# Patient Record
Sex: MALE | Race: ASIAN | NOT HISPANIC OR LATINO | Employment: FULL TIME
[De-identification: names, ages, dates, MRNs, and addresses within clinical notes are randomized per-mention and may not be internally consistent; named-entity substitution may affect disease eponyms.]

---

## 2017-08-28 ENCOUNTER — RECORDS - HEALTHEAST (OUTPATIENT)
Dept: ADMINISTRATIVE | Facility: OTHER | Age: 32
End: 2017-08-28

## 2018-08-30 ENCOUNTER — OFFICE VISIT - HEALTHEAST (OUTPATIENT)
Dept: FAMILY MEDICINE | Facility: CLINIC | Age: 33
End: 2018-08-30

## 2018-08-30 DIAGNOSIS — B02.9 HERPES ZOSTER WITHOUT COMPLICATION: ICD-10-CM

## 2018-08-30 ASSESSMENT — MIFFLIN-ST. JEOR: SCORE: 1742.79

## 2019-11-22 ENCOUNTER — OFFICE VISIT - HEALTHEAST (OUTPATIENT)
Dept: FAMILY MEDICINE | Facility: CLINIC | Age: 34
End: 2019-11-22

## 2019-11-22 DIAGNOSIS — Z00.00 HEALTH CARE MAINTENANCE: ICD-10-CM

## 2019-11-22 DIAGNOSIS — E66.811 OBESITY (BMI 30.0-34.9): ICD-10-CM

## 2019-11-22 DIAGNOSIS — E66.3 OVERWEIGHT (BMI 25.0-29.9): ICD-10-CM

## 2019-11-22 DIAGNOSIS — Z00.00 ROUTINE GENERAL MEDICAL EXAMINATION AT A HEALTH CARE FACILITY: ICD-10-CM

## 2019-11-22 DIAGNOSIS — Z71.84 TRAVEL ADVICE ENCOUNTER: ICD-10-CM

## 2019-11-22 LAB
CHOLEST SERPL-MCNC: 308 MG/DL
FASTING STATUS PATIENT QL REPORTED: NO
HBA1C MFR BLD: 5 % (ref 3.5–6)
HDLC SERPL-MCNC: 67 MG/DL
HIV 1+2 AB+HIV1 P24 AG SERPL QL IA: NEGATIVE
LDLC SERPL CALC-MCNC: 200 MG/DL
TRIGL SERPL-MCNC: 203 MG/DL

## 2019-11-22 RX ORDER — BISMUTH SUBSALICYLATE 262 MG/1
2 TABLET, CHEWABLE ORAL 4 TIMES DAILY PRN
Qty: 100 TABLET | Refills: 1 | Status: SHIPPED | OUTPATIENT
Start: 2019-11-22

## 2019-11-22 RX ORDER — AZITHROMYCIN 500 MG/1
1000 TABLET, FILM COATED ORAL
Qty: 6 TABLET | Refills: 0 | Status: SHIPPED | OUTPATIENT
Start: 2019-11-22

## 2019-11-22 ASSESSMENT — MIFFLIN-ST. JEOR: SCORE: 1670.21

## 2019-11-26 ENCOUNTER — COMMUNICATION - HEALTHEAST (OUTPATIENT)
Dept: FAMILY MEDICINE | Facility: CLINIC | Age: 34
End: 2019-11-26

## 2019-11-27 ENCOUNTER — COMMUNICATION - HEALTHEAST (OUTPATIENT)
Dept: FAMILY MEDICINE | Facility: CLINIC | Age: 34
End: 2019-11-27

## 2019-11-27 DIAGNOSIS — E78.5 HYPERLIPIDEMIA LDL GOAL <130: ICD-10-CM

## 2020-01-21 ENCOUNTER — OFFICE VISIT - HEALTHEAST (OUTPATIENT)
Dept: FAMILY MEDICINE | Facility: CLINIC | Age: 35
End: 2020-01-21

## 2020-01-21 DIAGNOSIS — E78.5 HYPERLIPIDEMIA LDL GOAL <130: ICD-10-CM

## 2020-01-21 ASSESSMENT — MIFFLIN-ST. JEOR: SCORE: 1720.11

## 2020-01-22 ENCOUNTER — AMBULATORY - HEALTHEAST (OUTPATIENT)
Dept: LAB | Facility: CLINIC | Age: 35
End: 2020-01-22

## 2020-01-22 DIAGNOSIS — E78.5 HYPERLIPIDEMIA LDL GOAL <130: ICD-10-CM

## 2020-01-22 LAB
CHOLEST SERPL-MCNC: 316 MG/DL
FASTING STATUS PATIENT QL REPORTED: YES
HDLC SERPL-MCNC: 63 MG/DL
LDLC SERPL CALC-MCNC: 230 MG/DL
TRIGL SERPL-MCNC: 114 MG/DL

## 2020-01-23 ENCOUNTER — COMMUNICATION - HEALTHEAST (OUTPATIENT)
Dept: FAMILY MEDICINE | Facility: CLINIC | Age: 35
End: 2020-01-23

## 2020-03-26 ENCOUNTER — AMBULATORY - HEALTHEAST (OUTPATIENT)
Dept: FAMILY MEDICINE | Facility: CLINIC | Age: 35
End: 2020-03-26

## 2020-03-26 ENCOUNTER — AMBULATORY - HEALTHEAST (OUTPATIENT)
Dept: LAB | Facility: CLINIC | Age: 35
End: 2020-03-26

## 2020-03-26 DIAGNOSIS — E78.5 HYPERLIPIDEMIA LDL GOAL <130: ICD-10-CM

## 2020-03-26 LAB
ALBUMIN SERPL-MCNC: 4.4 G/DL (ref 3.5–5)
ALP SERPL-CCNC: 40 U/L (ref 45–120)
ALT SERPL W P-5'-P-CCNC: 46 U/L (ref 0–45)
AST SERPL W P-5'-P-CCNC: 28 U/L (ref 0–40)
BILIRUB DIRECT SERPL-MCNC: 0.1 MG/DL
BILIRUB SERPL-MCNC: 0.4 MG/DL (ref 0–1)
CHOLEST SERPL-MCNC: 286 MG/DL
FASTING STATUS PATIENT QL REPORTED: YES
HDLC SERPL-MCNC: 56 MG/DL
LDLC SERPL CALC-MCNC: 179 MG/DL
PROT SERPL-MCNC: 8.1 G/DL (ref 6–8)
TRIGL SERPL-MCNC: 254 MG/DL

## 2020-03-27 ENCOUNTER — COMMUNICATION - HEALTHEAST (OUTPATIENT)
Dept: FAMILY MEDICINE | Facility: CLINIC | Age: 35
End: 2020-03-27

## 2020-03-27 DIAGNOSIS — E78.5 HYPERLIPIDEMIA LDL GOAL <130: ICD-10-CM

## 2020-05-11 ENCOUNTER — AMBULATORY - HEALTHEAST (OUTPATIENT)
Dept: LAB | Facility: CLINIC | Age: 35
End: 2020-05-11

## 2020-05-11 DIAGNOSIS — E78.5 HYPERLIPIDEMIA LDL GOAL <130: ICD-10-CM

## 2020-05-11 LAB
ALBUMIN SERPL-MCNC: 4.3 G/DL (ref 3.5–5)
ALP SERPL-CCNC: 34 U/L (ref 45–120)
ALT SERPL W P-5'-P-CCNC: 31 U/L (ref 0–45)
AST SERPL W P-5'-P-CCNC: 28 U/L (ref 0–40)
BILIRUB DIRECT SERPL-MCNC: 0.2 MG/DL
BILIRUB SERPL-MCNC: 0.5 MG/DL (ref 0–1)
CHOLEST SERPL-MCNC: 293 MG/DL
FASTING STATUS PATIENT QL REPORTED: YES
HDLC SERPL-MCNC: 57 MG/DL
LDLC SERPL CALC-MCNC: 203 MG/DL
PROT SERPL-MCNC: 7.4 G/DL (ref 6–8)
TRIGL SERPL-MCNC: 166 MG/DL

## 2020-05-12 ENCOUNTER — COMMUNICATION - HEALTHEAST (OUTPATIENT)
Dept: FAMILY MEDICINE | Facility: CLINIC | Age: 35
End: 2020-05-12

## 2020-05-12 DIAGNOSIS — E78.5 HYPERLIPIDEMIA LDL GOAL <130: ICD-10-CM

## 2020-05-14 RX ORDER — ROSUVASTATIN CALCIUM 40 MG/1
40 TABLET, COATED ORAL AT BEDTIME
Qty: 90 TABLET | Refills: 3 | Status: SHIPPED | OUTPATIENT
Start: 2020-05-14

## 2020-06-25 ENCOUNTER — AMBULATORY - HEALTHEAST (OUTPATIENT)
Dept: LAB | Facility: CLINIC | Age: 35
End: 2020-06-25

## 2020-06-25 DIAGNOSIS — E78.5 HYPERLIPIDEMIA LDL GOAL <130: ICD-10-CM

## 2020-06-25 LAB
CHOLEST SERPL-MCNC: 171 MG/DL
FASTING STATUS PATIENT QL REPORTED: YES
HDLC SERPL-MCNC: 58 MG/DL
LDLC SERPL CALC-MCNC: 98 MG/DL
TRIGL SERPL-MCNC: 77 MG/DL

## 2020-06-29 ENCOUNTER — COMMUNICATION - HEALTHEAST (OUTPATIENT)
Dept: FAMILY MEDICINE | Facility: CLINIC | Age: 35
End: 2020-06-29

## 2021-06-01 VITALS — BODY MASS INDEX: 31.99 KG/M2 | HEIGHT: 65 IN | WEIGHT: 192 LBS

## 2021-06-03 NOTE — TELEPHONE ENCOUNTER
Call pt and it took me directly to the voice mail. Left pt a message to call back.  Okay to relay message.

## 2021-06-03 NOTE — TELEPHONE ENCOUNTER
Call and spoke with pt regarding pt test result. Pt has a question if you are going to send them the medication to their pharmacy because pt does not have any cholesterol med to take.        ----- Message from Savannah Vincent MD sent at 11/26/2019  2:30 PM CST -----  Please tell the patient the following:  His cholesterol is very high.  His LDL, which should be less than 130 is 200.  With cholesterol this high, we recommend both changing diet and starting a cholesterol medication.  His HIV test is negative.  This means he does not have HIV.  His diabetes test is normal.  He does not have diabetes.    He was planning to travel for the month of November and December.  If he does not answer and we cannot get a hold of him directly, please try again in early January.  I will also send him a letter.

## 2021-06-03 NOTE — PROGRESS NOTES
Health Maintenance Exam  [facility]  Date of Service: 11/22/2019    Thao Ross is a 34 y.o. male who presents for a routine physical exam.     Current concerns include the following:    Travel:   11/25/19 - 12/26/19  Visiting [facility] Sachin Aguirre, Sachin Vasquez, Michelle  LaTha, Lavelle,     Active Non-Hospital Problems    Diagnosis     Overweight (BMI 25.0-29.9)     Past Medical History:   Diagnosis Date     No pertinent past medical history       Past Surgical History:   Procedure Laterality Date     NO PAST SURGERIES        Current Outpatient Medications   Medication Sig Dispense Refill     azithromycin (ZITHROMAX) 500 MG tablet Take 2 tablets (1,000 mg total) by mouth once as needed (travelers diarrhea). 6 tablet 0     bismuth subsalicylate (PEPTO-BISMOL) 262 mg Chew chew tab Chew 2 tablets (524 mg total) 4 (four) times a day as needed. For travelers diarrhea 100 tablet 1     mefloquine (LARIAM) 250 mg tablet Take 1 tablet (250 mg total) by mouth every 7 days for 10 doses. Begin 2 wks before departure, while in area, and for 4 wks after leaving. 10 tablet 0     No current facility-administered medications for this visit.       No Known Allergies   Social History     Socioeconomic History     Marital status:      Spouse name: Not on file     Number of children: 2     Years of education: Not on file     Highest education level: Associate degree: academic program   Occupational History     Occupation:    Social Needs     Financial resource strain: Not on file     Food insecurity:     Worry: Not on file     Inability: Not on file     Transportation needs:     Medical: Not on file     Non-medical: Not on file   Tobacco Use     Smoking status: Never Smoker     Smokeless tobacco: Never Used   Substance and Sexual Activity     Alcohol use: Yes     Comment: Only drinks on special occasions (just a little)     Drug use: Never     Sexual activity:  "Yes     Partners: Female     Birth control/protection: None     Comment: Planning pregnancy   Lifestyle     Physical activity:     Days per week: Not on file     Minutes per session: Not on file     Stress: Not on file   Relationships     Social connections:     Talks on phone: Not on file     Gets together: Not on file     Attends Voodoo service: Not on file     Active member of club or organization: Not on file     Attends meetings of clubs or organizations: Not on file     Relationship status: Not on file     Intimate partner violence:     Fear of current or ex partner: Not on file     Emotionally abused: Not on file     Physically abused: Not on file     Forced sexual activity: Not on file   Other Topics Concern     Not on file   Social History Narrative    Exercise: runs (3 hours per week), lifts weight    Diet: no special diet    Helmet: doesn't ride    Seat belts: wears      Family History   Problem Relation Age of Onset     No Medical Problems Mother      No Medical Problems Father      No Medical Problems Sister      No Medical Problems Brother      No Medical Problems Maternal Grandmother      No Medical Problems Maternal Grandfather      No Medical Problems Brother      No Medical Problems Brother      No Medical Problems Brother      No Medical Problems Sister      No Medical Problems Sister      No Medical Problems Sister      Other Brother          of accident     Other Brother          of accident      REVIEW OF SYSTEMS: negative, except as listed in subjective above.    Physical Exam   /74 (Patient Site: Left Arm, Patient Position: Sitting, Cuff Size: Adult Regular)   Pulse 94   Temp 97.3  F (36.3  C) (Oral)   Resp 16   Ht 5' 5\" (1.651 m)   Wt 176 lb (79.8 kg)   BMI 29.29 kg/m     Social History     Tobacco Use   Smoking Status Never Smoker   Smokeless Tobacco Never Used     General: Alert, NAD.  Head: NC/AT.  Ears: Normal canal, pinnae and tympanic membrane.  Eyes: PERRL, " normal fundi.  Nose: Normal mucosa.  Mouth: Adequate dentition, normal throat.  Neck: normal thyroid, midline trachea.  Breasts: no masses, skin changes, nipple discharge or tenderness.  Lungs: CTA bilaterally, no increased work of breathing.  Heart: RRR, no m/r/g  Abdomen: soft, nt/nd, BS+  Genitourinary: Normal vulva, normal vaginal mucosa, cervix normal appearance. No cervical or adnexal tenderness. No adnexal fullness.  Musculoskelatal: No significant deformity.  Skin: no atypical lesion or rash.  Lymphatics: no lymphadenopathy.   Psych: normal affect.    Physical on 11/22/2019   Component Date Value Ref Range Status     Hemoglobin A1c 11/22/2019 5.0  3.5 - 6.0 % Final     Cholesterol 11/22/2019 308* <=199 mg/dL Final     Triglycerides 11/22/2019 203* <=149 mg/dL Final     HDL Cholesterol 11/22/2019 67  >=40 mg/dL Final     LDL Calculated 11/22/2019 200* <=129 mg/dL Final     Patient Fasting > 8hrs? 11/22/2019 No   Final     HIV Antigen / Antibody 11/22/2019 Negative  Negative Final         Assessment & Plan   1. Health Maintenance  o Cancer screening: discussed self testicular exams  o Bone Health: Discussed Calcium, vitamin D, and weight bearing exercise.  o Immunizations: Reviewed and Updated today.  2. Overweight. Body mass index is 29.29 kg/m . The following high BMI interventions were performed this visit: encouragement to exercise and lifestyle education regarding diet.  Screen for diabetes and hyperlipidemia.  3. Advance directive: discussed. I have had an Advance Directives discussion with the patient.   4. Travel.  Shots updated.  Malaria prophylaxis and traveler's diarrhea medications prescribed.  Discussed safe travel practices.    Order Summary                                                      1. Routine general medical examination at a health care facility     2. Obesity (BMI 30.0-34.9)  Glycosylated Hemoglobin A1c    Lipid Cascade   3. Health care maintenance  Td, Preservative Free (green  label)    Influenza, Seasonal Quad, PF =/> 6months    HIV Antigen/Antibody Screening [place]    Hepatitis A adult 2 dose IM (19 yr & older)   4. Travel advice encounter  Typhoid, Inactive, Inj    mefloquine (LARIAM) 250 mg tablet    azithromycin (ZITHROMAX) 500 MG tablet    bismuth subsalicylate (PEPTO-BISMOL) 262 mg Chew chew tab   5. Overweight (BMI 25.0-29.9)        No future appointments.    Completed by: Savannah Vincent M.D., [facility]. 11/22/2019 11:19 AM.  This transcription uses voice recognition software, which may contain typographical errors.

## 2021-06-04 VITALS
HEART RATE: 74 BPM | SYSTOLIC BLOOD PRESSURE: 118 MMHG | HEIGHT: 65 IN | RESPIRATION RATE: 16 BRPM | WEIGHT: 187 LBS | BODY MASS INDEX: 31.16 KG/M2 | DIASTOLIC BLOOD PRESSURE: 78 MMHG | TEMPERATURE: 98.3 F

## 2021-06-04 VITALS
WEIGHT: 176 LBS | RESPIRATION RATE: 16 BRPM | BODY MASS INDEX: 29.32 KG/M2 | TEMPERATURE: 97.3 F | DIASTOLIC BLOOD PRESSURE: 74 MMHG | HEART RATE: 94 BPM | HEIGHT: 65 IN | SYSTOLIC BLOOD PRESSURE: 118 MMHG

## 2021-06-05 NOTE — PATIENT INSTRUCTIONS - HE
Physical on 11/22/2019   Component Date Value Ref Range Status     Hemoglobin A1c 11/22/2019 5.0  3.5 - 6.0 % Final     Cholesterol 11/22/2019 308* <=199 mg/dL Final     Triglycerides 11/22/2019 203* <=149 mg/dL Final     HDL Cholesterol 11/22/2019 67  >=40 mg/dL Final     LDL Calculated 11/22/2019 200* <=129 mg/dL Final     Patient Fasting > 8hrs? 11/22/2019 No   Final     HIV Antigen / Antibody 11/22/2019 Negative  Negative Final     Wt Readings from Last 6 Encounters:   01/21/20 187 lb (84.8 kg)   11/22/19 176 lb (79.8 kg)   08/30/18 192 lb (87.1 kg)   02/22/16 199 lb 8 oz (90.5 kg)     BP Readings from Last 6 Encounters:   01/21/20 118/78   11/22/19 118/74   08/30/18 100/60   02/22/16 122/82

## 2021-06-05 NOTE — TELEPHONE ENCOUNTER
----- Message from Savannah Vincent MD sent at 1/23/2020 10:53 AM CST -----  Please tell the patient that his cholesterol went UP.    I would like him to:  1) Start a cholesterol pill.  2) See a nutritionist.  I will put in the referral.    3) Have his family members tested for high cholesterol.  4) Follow-up with me in 2 months to recheck cholesterol.  Please make an appointment for this now.

## 2021-06-05 NOTE — TELEPHONE ENCOUNTER
Called and spoke with Lucian Ross , Message was given, Lucian Ross  understood, no further questions.  Appointment made to come in.

## 2021-06-05 NOTE — PROGRESS NOTES
"Office Visit  [facility]  Date of Service: 01/21/2020      Subjective   Lucian Ross is a 34 y.o. male who presents for follow up on chlosterol    Lucian is here today for follow-up of his cholesterol measurements.    The patient was seen last summer for routine physical.  At that time, his LDL was 200, triglycerides 203, HDL 67 and total cholesterol 308.  He reports that he was not eating well at that time.  I had recommended starting a statin, but he preferred to modify his diet and recheck labs after dietary modification.  He returns today for follow-up of that.    As far as he knows, there is no family history of significant hyperlipidemia.  He has not previously been diagnosed with cholesterol problems.  He has normal blood pressure.  His hemoglobin A1c was 5.0%.  BMI is 31.      Objective   /78 (Patient Site: Left Arm, Patient Position: Sitting, Cuff Size: Adult Regular)   Pulse 74   Temp 98.3  F (36.8  C) (Oral)   Resp 16   Ht 5' 5\" (1.651 m)   Wt 187 lb (84.8 kg)   BMI 31.12 kg/m     He reports that he has never smoked. He has never used smokeless tobacco.  Wt Readings from Last 6 Encounters:   01/21/20 187 lb (84.8 kg)   11/22/19 176 lb (79.8 kg)   08/30/18 192 lb (87.1 kg)   02/22/16 199 lb 8 oz (90.5 kg)       Gen: Alert, no apparent distress.  Heart: Regular rate and rhythm, no murmurs.  Lungs: Clear to auscultation bilaterally, no increased work of breathing.  Abdomen: Soft, non-tender, non-distended, bowel sounds normal.  Extremities: No clubbing, cyanosis, edema.      Physical on 11/22/2019   Component Date Value Ref Range Status     Hemoglobin A1c 11/22/2019 5.0  3.5 - 6.0 % Final     Cholesterol 11/22/2019 308* <=199 mg/dL Final     Triglycerides 11/22/2019 203* <=149 mg/dL Final     HDL Cholesterol 11/22/2019 67  >=40 mg/dL Final     LDL Calculated 11/22/2019 200* <=129 mg/dL Final     Patient Fasting > 8hrs? 11/22/2019 No   Final     HIV Antigen / " Antibody 11/22/2019 Negative  Negative Final         Assessment & Plan     Hyperlipidemia, LDL.  LDL today is 230.  Recommend starting high intensity statin.  Prescription sent in for rosuvastatin 20 mg daily.  Recheck in 2 months.  Referral made for nutritionist.  Recommend ongoing diet modification.  Recommended weight loss, to achieve BMI of less than 25.  Recommended that he have his family members tested for high cholesterol as well.     Order Summary                                                      1. Hyperlipidemia LDL goal <130  Lipid Edwards    Ambulatory referral to Nutrition Services    rosuvastatin (CRESTOR) 20 MG tablet    CANCELED: Lipid [place]      Future Appointments   Date Time Provider Department Center   3/26/2020  9:20 AM Savannah Vincent MD [facility]       Completed by: Savannah Vincent M.D., [facility]. 1/24/2020 2:19 PM.  This transcription uses voice recognition software, which may contain typographical errors.

## 2021-06-07 NOTE — TELEPHONE ENCOUNTER
Please tell Lucian that his cholesterol is much better. His LDL came down by 50 mg/dL, which is excellent. Even with that, his cholesterol is still higher than we would like. It was super high before. So I think we need to adjust his cholesterol pill a little.     I sent in the new prescription:  OLD dose: rosuvastatin 20 mg every night  NEW dose: rosuvastatin 40 mg every night    His liver test is just a tiny bit high, so we'll keep an eye on that. I would recommend avoiding excessive alcohol (more than 2 drinks a day).    He should come back for a lab check in SIX weeks to recheck the cholesterol and liver.  Please schedule the lab visit.      Cholesterol   Date Value Ref Range Status   03/26/2020 286 (H) <=199 mg/dL Final   01/22/2020 316 (H) <=199 mg/dL Final   11/22/2019 308 (H) <=199 mg/dL Final     LDL Calculated   Date Value Ref Range Status   03/26/2020 179 (H) <=129 mg/dL Final   01/22/2020 230 (H) <=129 mg/dL Final   11/22/2019 200 (H) <=129 mg/dL Final     No components found for: DIRECTLDL   HDL Cholesterol   Date Value Ref Range Status   03/26/2020 56 >=40 mg/dL Final   01/22/2020 63 >=40 mg/dL Final   11/22/2019 67 >=40 mg/dL Final     Triglycerides   Date Value Ref Range Status   03/26/2020 254 (H) <=149 mg/dL Final   01/22/2020 114 <=149 mg/dL Final   11/22/2019 203 (H) <=149 mg/dL Final         Lab on 03/26/2020   Component Date Value Ref Range Status     Cholesterol 03/26/2020 286* <=199 mg/dL Final     Triglycerides 03/26/2020 254* <=149 mg/dL Final     HDL Cholesterol 03/26/2020 56  >=40 mg/dL Final     LDL Calculated 03/26/2020 179* <=129 mg/dL Final     Patient Fasting > 8hrs? 03/26/2020 Yes   Final     Bilirubin, Total 03/26/2020 0.4  0.0 - 1.0 mg/dL Final     Bilirubin, Direct 03/26/2020 0.1  <=0.5 mg/dL Final     Protein, Total 03/26/2020 8.1* 6.0 - 8.0 g/dL Final     Albumin 03/26/2020 4.4  3.5 - 5.0 g/dL Final     Alkaline Phosphatase 03/26/2020 40* 45 - 120 U/L Final     AST 03/26/2020 28   0 - 40 U/L Final     ALT 03/26/2020 46* 0 - 45 U/L Final

## 2021-06-07 NOTE — TELEPHONE ENCOUNTER
Called and spoke with Lucian Ross , Message was given, Lucian Ross  understood, no further questions.  Scheduled lab visit

## 2021-06-08 NOTE — TELEPHONE ENCOUNTER
I resent his medicine to his pharmacy. Please ask him to start it now and recheck cholesterol in six weeks. His cholesterol is VERY high. Make lab only appointment. Order is in for the lab. Come fasting.

## 2021-06-08 NOTE — TELEPHONE ENCOUNTER
----- Message from Savannah Vincent MD sent at 5/12/2020  1:20 PM CDT -----  Please let Lucian know that his cholesterol is still super high. Is he taking his medicine? Please let me know.

## 2021-06-16 PROBLEM — E78.5 HYPERLIPIDEMIA LDL GOAL <130: Status: ACTIVE | Noted: 2019-11-26

## 2021-06-16 PROBLEM — E66.3 OVERWEIGHT: Status: ACTIVE | Noted: 2019-11-22

## 2021-06-19 NOTE — LETTER
Letter by Savannah Vincent MD at      Author: Savannah Vincent MD Service: -- Author Type: --    Filed:  Encounter Date: 11/27/2019 Status: Signed         Lucian Ross,      As a valued [facility] patient, your healthcare needs are our priority. Our clinic records indicate we have     attempted to contact you, but have not heard back from you after three (3) attempts. Your cholesterol is very     high. LDL, which should be less than 130 is 200.  With cholesterol this high, we recommend both changing     diet and starting a cholesterol medication. Your HIV test is negative.  This means you do not have HIV.    Ddiabetes test is normal, you do not have diabetes. If you have any questions please feel free to contact our     Office at [phone]      Sincerely,    Your care Team at [facility]

## 2021-06-19 NOTE — LETTER
Letter by Savannah Vincent MD at      Author: Savannah Vincent MD Service: -- Author Type: --    Filed:  Encounter Date: 11/26/2019 Status: Signed       Lucian Ross  [address] [place] [address]          11/26/2019    Re: Lucian Ross  YOB: 1985      Dear Lucian;    Your cholesterol is VERY high.  When cholesterol is this high, we usually start a cholesterol medicine and also change diet.  I would like to see you back to discuss this in more detail.  Please make an appointment to see me in January.  In the meantime, eat less animal fat (less cream, butter, whole milk, fatty meat, chicken skin, etc.).    You do not have HI.  You do not have diabetes.    Physical on 11/22/2019   Component Date Value Ref Range Status   ? Hemoglobin A1c 11/22/2019 5.0  3.5 - 6.0 % Final   ? Cholesterol 11/22/2019 308* <=199 mg/dL Final   ? Triglycerides 11/22/2019 203* <=149 mg/dL Final   ? HDL Cholesterol 11/22/2019 67  >=40 mg/dL Final   ? LDL Calculated 11/22/2019 200* <=129 mg/dL Final   ? Patient Fasting > 8hrs? 11/22/2019 No   Final   ? HIV Antigen / Antibody 11/22/2019 Negative  Negative Final       If you have any questions, please call [phone].    Sincerely,    Savannah Vincent MD  Family Physician  [facility]

## 2021-06-20 NOTE — LETTER
Letter by Savannah Vincent MD at      Author: Savannah Vincent MD Service: -- Author Type: --    Filed:  Encounter Date: 6/29/2020 Status: (Other)         Lucian Ross  [address] [place] [address]             June 29, 2020        Dear Mr. Ross,    Below are the results from your recent visit:    Resulted Orders   Lipid Cascade   Result Value Ref Range    Cholesterol 171 <=199 mg/dL    Triglycerides 77 <=149 mg/dL    HDL Cholesterol 58 >=40 mg/dL    LDL Calculated 98 <=129 mg/dL    Patient Fasting > 8hrs? Yes        Your cholesterol levels are EXCELLENT now.  Your cholesterol medicine is working well. Continue taking it (lifelong).    Please call with questions or contact us using [employer].    Sincerely,        Electronically signed by Savannah Vincent MD

## 2021-06-20 NOTE — PROGRESS NOTES
"Subjective:  32 y.o. male with concerns of rash.  Otherwise healthy  One week rash started under right armpit.  Spread a little forward and backward.  Itches.  Small blisters on red base.  Sheets bother at night.  Not too painful, otherwise.    No therapies tried other than hot compress to help with sleep.    No outpatient prescriptions prior to visit.     No facility-administered medications prior to visit.       History   Smoking Status     Never Smoker   Smokeless Tobacco     Never Used      Objective:  /60 (Patient Site: Right Arm, Patient Position: Sitting, Cuff Size: Adult Regular)  Pulse 64  Temp 98.4  F (36.9  C) (Oral)   Ht 5' 5\" (1.651 m)  Wt 192 lb (87.1 kg)  BMI 31.95 kg/m2  GENERAL: alert, not distressed  CHEST: clear, no rales, rhonchi, or wheezes  CARDIAC: regular without murmur  ABDOMEN: soft, non tender, non distended, normal bowel sounds  SKIN: red base rash in various stages of vesicle to crusted lesion.  Some scattered mid pectoral.  Two 3 cm x 1 cm areas anterior and posterior axillary line.  Another more scapular.    No hx of VZV vaccine in chart.  Unknown varicella hx.    Assessment and Plan:   1. Herpes zoster without complication  Discussed reason for antiviral reduction of PHN.  Still some newish lesions so worth treating.  Declines pain control.  Discussed contact precautions, pregnancies, and unvaccinated children.  Follow up if problems.  - valACYclovir (VALTREX) 1000 MG tablet; Take 1 tablet (1,000 mg total) by mouth 3 (three) times a day for 7 days.  Dispense: 21 tablet; Refill: 0   "

## 2021-07-03 NOTE — ADDENDUM NOTE
Addendum Note by Savannah Vincent MD at 5/14/2020  1:25 PM     Author: Savannah Vincent MD Service: -- Author Type: Physician    Filed: 5/14/2020  1:25 PM Encounter Date: 5/12/2020 Status: Signed    : Savannah Vincent MD (Physician)    Addended by: SAVANNAH VINCENT on: 5/14/2020 01:25 PM        Modules accepted: Orders

## 2021-07-03 NOTE — ADDENDUM NOTE
Addendum Note by Savannah Vincent MD at 5/14/2020  1:07 PM     Author: Savannah Vincent MD Service: -- Author Type: Physician    Filed: 5/14/2020  1:07 PM Encounter Date: 5/12/2020 Status: Signed    : Savannah Vincent MD (Physician)    Addended by: SAVANNAH VINCENT on: 5/14/2020 01:07 PM        Modules accepted: Orders

## 2021-10-26 ENCOUNTER — TELEPHONE (OUTPATIENT)
Dept: FAMILY MEDICINE | Facility: CLINIC | Age: 36
End: 2021-10-26
Payer: COMMERCIAL

## 2021-10-26 DIAGNOSIS — Z20.822 EXPOSURE TO COVID-19 VIRUS: Primary | ICD-10-CM

## 2021-10-26 PROCEDURE — 99207 PR NO CHARGE LOS: CPT | Performed by: FAMILY MEDICINE

## 2021-10-26 NOTE — TELEPHONE ENCOUNTER
Please let patient  Know that referrals for covid testing are in for him and the kids (Timbo and Jhony).    Diagnoses and all orders for this visit:    Exposure to COVID-19 virus  -     Asymptomatic COVID-19 Virus (Coronavirus) by PCR; Future        Procedure   Suture Removal  Date/Time: 4/28/2017 10:29 AM  Performed by: MOON MARQUEZ  Authorized by: ADRIANNA SONI   Consent: Verbal consent obtained.  Consent given by: patient  Patient identity confirmed: verbally with patient  Body area: head/neck  Location details: left cheek  Comments: Patient returns for suture removal. The incision is well-approximated and no infection noted. Patient notified of pathology and she will follow up with Dr. Soriano on 5/11/17 regarding this.

## 2021-10-26 NOTE — TELEPHONE ENCOUNTER
LMTCB #1 @ [phone]. Postponing task to tomorrow to try again. If patient returns call back, please help patient schedule an appointment per message below. Thanks!

## 2021-10-26 NOTE — TELEPHONE ENCOUNTER
The pt's wife was calling stating that she had a Virtual appointment with Dr. Vincent this morning, she recently tested positive for Covid and she wanted her  and children to be tested also. There was supposed to be orders placed so they can be called to be tested and nothing has been placed and they have not been called yet.     The pt's wife is asking for orders to be placed for her  and children (encounters made), call her  at [phone] to have all appointments scheduled.

## 2021-10-28 ENCOUNTER — LAB (OUTPATIENT)
Dept: LAB | Facility: CLINIC | Age: 36
End: 2021-10-28
Payer: COMMERCIAL

## 2021-10-28 DIAGNOSIS — Z20.822 EXPOSURE TO COVID-19 VIRUS: ICD-10-CM

## 2021-10-28 PROCEDURE — U0005 INFEC AGEN DETEC AMPLI PROBE: HCPCS

## 2021-10-28 PROCEDURE — U0003 INFECTIOUS AGENT DETECTION BY NUCLEIC ACID (DNA OR RNA); SEVERE ACUTE RESPIRATORY SYNDROME CORONAVIRUS 2 (SARS-COV-2) (CORONAVIRUS DISEASE [COVID-19]), AMPLIFIED PROBE TECHNIQUE, MAKING USE OF HIGH THROUGHPUT TECHNOLOGIES AS DESCRIBED BY CMS-2020-01-R: HCPCS

## 2021-10-29 LAB — SARS-COV-2 RNA RESP QL NAA+PROBE: NEGATIVE

## 2021-11-01 ENCOUNTER — TELEPHONE (OUTPATIENT)
Dept: FAMILY MEDICINE | Facility: CLINIC | Age: 36
End: 2021-11-01

## 2021-11-01 NOTE — TELEPHONE ENCOUNTER
Called pt, and relayed message    ----- Message from Savannah Vincent MD sent at 10/31/2021 10:28 AM CDT -----  Please let Lucian know:  You don't have this infection.

## 2024-12-05 ENCOUNTER — TELEPHONE (OUTPATIENT)
Dept: FAMILY MEDICINE | Facility: CLINIC | Age: 39
End: 2024-12-05

## 2024-12-05 DIAGNOSIS — E78.5 HYPERLIPIDEMIA LDL GOAL <130: Primary | ICD-10-CM

## 2024-12-05 NOTE — TELEPHONE ENCOUNTER
Future Appointments 12/5/2024 - 6/3/2025        Date Visit Type Length Department Provider     2/4/2025  8:00 AM LAB 15 min [facility]    Location Instructions:     We are located at [address] [place] [address]. We offer free parking in the lot on [address] across the street from the clinic. Please check in at the  through the main entrance.              2/11/2025  8:20 AM PREVENTATIVE ADULT 40 min [facility] Savannah Vincent MD    Location Instructions:     [facility] is located at [address] [place] [address] in [place], at the intersection of [facility]. This is one block south of the [facility]. Free parking is available in the lot directly north of the clinic across [facility]. The clinic is near stops along bus [address].

## 2024-12-05 NOTE — TELEPHONE ENCOUNTER
Due for physical and lab test. Please schedule.   Can schedule lab only now. Come fasting for cholesterol.   Back up number [phone] or [phone].